# Patient Record
Sex: MALE | Race: WHITE
[De-identification: names, ages, dates, MRNs, and addresses within clinical notes are randomized per-mention and may not be internally consistent; named-entity substitution may affect disease eponyms.]

---

## 2018-12-18 ENCOUNTER — HOSPITAL ENCOUNTER (OUTPATIENT)
Dept: HOSPITAL 47 - RADXRMAIN | Age: 63
Discharge: HOME | End: 2018-12-18
Attending: FAMILY MEDICINE
Payer: COMMERCIAL

## 2018-12-18 DIAGNOSIS — M48.061: Primary | ICD-10-CM

## 2018-12-18 PROCEDURE — 72110 X-RAY EXAM L-2 SPINE 4/>VWS: CPT

## 2018-12-18 NOTE — XR
EXAMINATION TYPE: XR lumbosacral spine min 4V

 

DATE OF EXAM: 12/18/2018

 

CLINICAL HISTORY: Chronic low back pain worse recently.

 

TECHNIQUE: Frontal, lateral, and oblique images of the lumbar spine are obtained.

 

COMPARISON: None.

 

FINDINGS:  There are 5 lumbar type vertebral bodies identified.  The lumbar spine shows satisfactory 
alignment without evidence of acute fracture or dislocation. Vertebral body heights heights are withi
n normal limits. There is mild disc space narrowing upper lumbar levels. There is mild multilevel ant
erior spurring.   The oblique images appear within normal limits. Cholecystectomy clips are seen in t
he overlying soft tissue.

 

IMPRESSION: As above.

## 2020-06-10 ENCOUNTER — HOSPITAL ENCOUNTER (OUTPATIENT)
Dept: HOSPITAL 47 - LABPAT | Age: 65
Discharge: HOME | End: 2020-06-10
Attending: ORTHOPAEDIC SURGERY
Payer: COMMERCIAL

## 2020-06-10 DIAGNOSIS — Z01.812: Primary | ICD-10-CM

## 2020-06-10 PROCEDURE — 87070 CULTURE OTHR SPECIMN AEROBIC: CPT

## 2020-06-15 NOTE — HP
HISTORY AND PHYSICAL



CHIEF COMPLAINT:

Right knee pain.



HISTORY OF PRESENT ILLNESS:

Patient is a 64-year-old retired gentleman who presents with progressive right knee

pain secondary to osteoarthrosis for the past several years.  It has worsened recently.

He notes giving out, in addition to swelling and stiffness.  He notes it limits his

normal function and activities.  He has tried previous medications along with

injections without much relief.



PAST MEDICAL HISTORY:

Significant for arthritis.



PAST SURGICAL HISTORY:

Negative.



CURRENT MEDICATIONS:

None.  He denies drug allergies.



FAMILY HISTORY:

Negative.



SOCIAL HISTORY:

Negative for current tobacco or alcohol use.



16 POINT REVIEW OF SYSTEMS:

Otherwise reviewed and is noncontributory.



PHYSICAL EXAMINATION:

On examination, the patient is approximately 5 foot 9, 170 pounds of mesomorphic

habitus.

HEENT exam is nonfocal.

NECK:  Supple.  He has painless passive motion of the right hip.  Straight leg raise is

negative.  Active motion right knee -10 to 120 degrees of flexion.  He is tender about

the medial joint line.  He has no effusion.  Collaterals are stable, Lachman is

negative, Calvin's is equivocal.  He has genu varum alignment.  His distal

neurovascular exam appears intact in the right lower extremity.



Weightbearing notch, lateral Merchant views of the right knee are obtained in the

office and show severe medial and patellofemoral compartment narrowing.



IMPRESSION:

Right knee severe medial and patellofemoral compartment osteoarthrosis.



RECOMMENDATIONS:

I talked to the patient at length regarding his condition and along the treatment

options.  This point he is quite symptomatic and limited because of pain related to his

osteoarthrosis despite extensive conservative measures.  After thorough discussion, he

opts to proceed with surgery.  We will plan to proceed with right total knee

arthroplasty.  We will institute DVT prophylaxis postoperatively.  Risks and benefits

were discussed at length in layman's terms.





MMODL / IJN: [id number] / Job#: [id number]

## 2020-06-16 ENCOUNTER — HOSPITAL ENCOUNTER (OUTPATIENT)
Dept: HOSPITAL 47 - OR | Age: 65
LOS: 1 days | Discharge: HOME HEALTH | End: 2020-06-17
Attending: ORTHOPAEDIC SURGERY
Payer: COMMERCIAL

## 2020-06-16 VITALS — BODY MASS INDEX: 24.7 KG/M2

## 2020-06-16 DIAGNOSIS — Z87.11: ICD-10-CM

## 2020-06-16 DIAGNOSIS — Z90.49: ICD-10-CM

## 2020-06-16 DIAGNOSIS — K21.9: ICD-10-CM

## 2020-06-16 DIAGNOSIS — D72.828: ICD-10-CM

## 2020-06-16 DIAGNOSIS — Z98.890: ICD-10-CM

## 2020-06-16 DIAGNOSIS — K29.70: ICD-10-CM

## 2020-06-16 DIAGNOSIS — M81.0: ICD-10-CM

## 2020-06-16 DIAGNOSIS — R00.1: ICD-10-CM

## 2020-06-16 DIAGNOSIS — M17.11: Primary | ICD-10-CM

## 2020-06-16 DIAGNOSIS — M54.9: ICD-10-CM

## 2020-06-16 DIAGNOSIS — R12: ICD-10-CM

## 2020-06-16 DIAGNOSIS — Z79.4: ICD-10-CM

## 2020-06-16 PROCEDURE — 73560 X-RAY EXAM OF KNEE 1 OR 2: CPT

## 2020-06-16 PROCEDURE — 97161 PT EVAL LOW COMPLEX 20 MIN: CPT

## 2020-06-16 PROCEDURE — 76942 ECHO GUIDE FOR BIOPSY: CPT

## 2020-06-16 PROCEDURE — 64448 NJX AA&/STRD FEM NRV NFS IMG: CPT

## 2020-06-16 PROCEDURE — 85025 COMPLETE CBC W/AUTO DIFF WBC: CPT

## 2020-06-16 PROCEDURE — 27447 TOTAL KNEE ARTHROPLASTY: CPT

## 2020-06-16 PROCEDURE — 88300 SURGICAL PATH GROSS: CPT

## 2020-06-16 RX ADMIN — POTASSIUM CHLORIDE SCH MLS: 14.9 INJECTION, SOLUTION INTRAVENOUS at 13:34

## 2020-06-16 NOTE — P.ANPRN
Procedure Note - Anesthesia





- Nerve Block Performed


  ** Right Adductor Canal Infusion


Date of Procedure: 06/16/20


Procedure Start Time: 13:52


Procedure Stop Time: 14:05


Location of Patient: PreOp


Indication: Acute Post-Operative Pain, Requested by Surgeon


Sedation Type: Sedate with meaningful contact maintained


Preparation: Sterile Prep, Sterile Dressing


Position: Supine


Catheter: Indwelling


Needle Types: Pajunk


Needle Gauge: 21


Ultrasound used to visualize needle placement: Yes


Ultrasound used to observe medication spread: Yes


Blood Aspirated: No


Pain Paresthesia on Injection Noted: No


Resistance on Injection: Normal


Image Stored and Saved: Yes


Events: Uneventful and Well Tolerated (25 mls of ropivacaine 0.5 %)

## 2020-06-16 NOTE — P.OP
Date of Procedure: 06/16/20


Preoperative Diagnosis: 


Right knee severe tricompartmental osteoarthrosis


Postoperative Diagnosis: 


Same


Procedure(s) Performed: 


Right total knee arthroplastycementedcruciate retaining


Implants: 


[employer] journey size 7 cemented femoral component, size 5 cemented tibial

component, 10 mm articular surface and 35 mm patellar component.


Anesthesia: regional, local, spinal


Surgeon: Marco Brady


Assistant #1: Rodger Abdi


Estimated Blood Loss (ml): 50


Pathology: other (Bone fragments)


Condition: stable


Disposition: PACU


Indications for Procedure: 


Patient is a 64-year-old gentleman who presents with progressive right knee pain

secondary to osteoporosis despite conservative measures.  A discussion of the 

risks and benefits of operative intervention versus continued conservative 

measures was made with patient.  He opted to proceed with surgery.  Operative 

risks to include infection, neurovascular injury, development of blood clots, 

possible component loosening, possible component failure need for subsequent pr

ocedures was discussed.  Informed consent was obtained.


Operative Findings: 


As below


Description of Procedure: 


The patient was brought to the operating room, and after induction of spinal 

anesthesia the right lower extremity was prepped and draped in a normal fashion.

 The tourniquet was inflated to 270 mmHg.  A longitudinal incision extending 3 

finger breaths above  the superior pole of the patella extending to the medial 

aspect the tibial tubercle was then made.  The skin and subcutaneous tissues 

were divided sharply.  Electrocautery was used for hemostasis.  A medial 

parapatellar arthrotomy was then performed.  The medial soft tissues to include 

the superficial and deep portions of the medial collateral ligament as well as 

the medial hamstring tendons were elevated subperiosteally.  The proximal medial

tibia osteophytes were carefully removed.  The patella was everted.  The knee 

was flexed.  A portion of the retropatellar fat pad was excised sharply.  The 

anterior cruciate ligament was sacrificed.  A starting hole was made in the 

distal femur 1 cm anterior to the posterior cruciate origin.  An intramedullary 

femoral guide was gently inserted planning on 5  valgus distal cut with 9 mm 

distal resection.  The cutting block was pinned in place.  The distal cut was 

then made.  The posterior referencing sizing guide was utilized.  3  of 

external rotation was built into the system and verified off the trans-

epicondylar axis and the posterior condyles.  I felt size 7 was most 

appropriate.  The cutting block was pinned in place.  The anterior, posterior, 

and chamfer cuts were then made.  The bone fragments were removed.    The trial 

size 7 femoral component was then placed and was fully seated.  There was good 

anterior to posterior and medial to lateral fit.  The distal peg holes were then

drilled.  The trial component was then removed.  Attention was then paid towards

preparing the proximal tibia.  An extra medullary guide was utilized in line 

with the tibial shaft and second metatarsal distally.  A 3  posterior slope was

planned.  I planned on 2 mm resection from the medial compartment.  The cutting 

block was pinned in place.  The proximal tibial cut was then made.  The bone was

removed in one fragment.  The remnants of the medial and lateral menisci were 

excised the capsule junction with electrocautery.  The tibia sized most 

appropriately at size 5.  The posterior osteophytes off the distal femur were 

carefully removed with a curved osteotome.  The trial tibial and femoral 

components were placed along with a 10 millimeters articular surface.  I was 

able to obtain full flexion and extension with good stability with varus and 

valgus stress.  After several flexion and extension cycles, the tibial rotation 

was marked with electrocautery in line with the medial one third of the tibial 

tubercle.  Attention was then paid towards preparing the patella.  A patella 

reamer was utilized taking this down to 14 mm of bone stock.  A good flush cut 

was made.  The patella sized most appropriately at 35 millimeters.  The peg 

holes were then drilled.  The trial component was placed.  The knee was taken t

hrough a range of motion.  I had good patellofemoral tracking with no hands 

technique.  The trial components were then removed.  The tibia was prepared in 

the appropriate rotation with appropriate drill and keel punch.  The flexion and

extension gaps were checked and felt to be symmetric.  The posterior soft 

tissues were injected with ropivacaine.  The bony surfaces were prepared with 

pulsatile lavage and dried.  The deep tibial component was then cemented in 

place and was fully seated.  Excess cement was removed.  The femoral component 

was cemented in place and was fully seated.  Again excess cement was removed.  

The trial 10 millimeters surface was then inserted in the knee was put in full 

extension.  The patella component was cemented in place.  After the cement had 

sufficiently hardened, the knee was again taken through a range of motion.  

Again there was good stability in flexion and extension with varus and valgus 

stress.  The trial articular surface was then removed.  The final articular 

surface was placed and was impacted.  Care was taken to avoid any soft tissue 

interposition.  Pulsatile lavage was again utilized.  The tourniquet was 

deflated with  approximately 75 minutes total tourniquet time.  There was 

minimal drainage therefore a deep drain was not placed.  The medial parapatellar

arthrotomy was then closed with #2 Ethibond suture.  The subcutaneous tissues 

were reapproximated interrupted 2-0 Vicryl sutures.  The skin was reapproximated

with 3-0 subarticular strata fix suture.  Skin tape and adhesive was applied.  A

sterile dressing was applied.  The patient was then awoken from sedation and 

transferred to recovery room in good condition.  Blood loss was estimated at D 

milliliters.  No complications  were incurred.  Sponge and needle counts were 

correct at the end the case.  Trell VARGHESE assisted during the major 

components this case to include exposure, bone resection, and implantation.

## 2020-06-16 NOTE — XR
EXAMINATION TYPE: XR knee limited RT

 

DATE OF EXAM: 6/16/2020

 

COMPARISON: None

 

HISTORY: Postop right knee

 

TECHNIQUE: 2 view right knee

 

FINDINGS: Tibial and femoral components of in place. Postsurgical changes are evident. 

 

IMPRESSION:

1.  No acute fractures post right knee replacement.

## 2020-06-17 VITALS
TEMPERATURE: 97.7 F | SYSTOLIC BLOOD PRESSURE: 154 MMHG | HEART RATE: 87 BPM | DIASTOLIC BLOOD PRESSURE: 77 MMHG | RESPIRATION RATE: 16 BRPM

## 2020-06-17 LAB
BASOPHILS # BLD AUTO: 0 K/UL (ref 0–0.2)
BASOPHILS NFR BLD AUTO: 0 %
EOSINOPHIL # BLD AUTO: 0 K/UL (ref 0–0.7)
EOSINOPHIL NFR BLD AUTO: 0 %
ERYTHROCYTE [DISTWIDTH] IN BLOOD BY AUTOMATED COUNT: 5.05 M/UL (ref 4.3–5.9)
ERYTHROCYTE [DISTWIDTH] IN BLOOD: 12.4 % (ref 11.5–15.5)
HCT VFR BLD AUTO: 48.6 % (ref 39–53)
HGB BLD-MCNC: 16.3 GM/DL (ref 13–17.5)
LYMPHOCYTES # SPEC AUTO: 1.3 K/UL (ref 1–4.8)
LYMPHOCYTES NFR SPEC AUTO: 6 %
MCH RBC QN AUTO: 32.3 PG (ref 25–35)
MCHC RBC AUTO-ENTMCNC: 33.6 G/DL (ref 31–37)
MCV RBC AUTO: 96.3 FL (ref 80–100)
MONOCYTES # BLD AUTO: 0.9 K/UL (ref 0–1)
MONOCYTES NFR BLD AUTO: 5 %
NEUTROPHILS # BLD AUTO: 18.2 K/UL (ref 1.3–7.7)
NEUTROPHILS NFR BLD AUTO: 88 %
PLATELET # BLD AUTO: 317 K/UL (ref 150–450)
WBC # BLD AUTO: 20.6 K/UL (ref 3.8–10.6)

## 2020-06-17 RX ADMIN — POTASSIUM CHLORIDE SCH: 14.9 INJECTION, SOLUTION INTRAVENOUS at 08:26

## 2020-06-17 NOTE — P.CONS
History of Present Illness





- Reason for Consult


Leukocytosis





- History of Present Illness


This is a pleasant 64-year-old gentleman admitted for right C6 insulin-dependent

surgery patient denied any fever chills dysuria of patient doesn't have any 

other evidence of infection at leukocytosis which is postoperative reactive 

response.  Patient is clinically doing well pain is well-controlled move his 

bowel.








Review of Systems








REVIEW OF SYSTEMS: 


CONSTITUTIONAL: No fever, no malaise, no fatigue. 


HEENT: No recent visual problems or hearing problems. Denied any sore throat. 


CARDIOVASCULAR: No chest pain, orthopnea, PND, no palpitations, no syncope. 


PULMONARY: No shortness of breath, no cough, no hemoptysis. 


GASTROINTESTINAL: No diarrhea, no nausea, no vomiting, no abdominal pain. 


NEUROLOGICAL: No headaches, no weakness, no numbness. 


HEMATOLOGICAL: Denies any bleeding or petechiae. 


GENITOURINARY: Denies any burning micturition, frequency, or urgency. 


MUSCULOSKELETAL/RHEUMATOLOGICAL: Denies any joint pain, swelling, or any muscle 

pain. 


ENDOCRINE: Denies any polyuria or polydipsia. 





The rest of the 14-point review of systems is negative.











Past Medical History


Past Medical History: GERD/Reflux, Osteoarthritis (OA)


Additional Past Medical History / Comment(s): bruises easily, frequent back pain


History of Any Multi-Drug Resistant Organisms: None Reported


Past Surgical History: Hernia Repair, Tonsillectomy


Additional Past Surgical History / Comment(s): EGD


Past Anesthesia/Blood Transfusion Reactions: No Reported Reaction


Past Psychological History: No Psychological Hx Reported


Smoking Status: Never smoker


Past Alcohol Use History: None Reported


Past Drug Use History: None Reported





- Past Family History


  ** Mother


Family Medical History: No Reported History





Medications and Allergies


                                Home Medications











 Medication  Instructions  Recorded  Confirmed  Type


 


Ascorbic Acid [Vitamin C] 500 mg PO DAILY 06/15/20 06/15/20 History


 


Famotidine [Pepcid] 20 mg PO DAILY PRN 06/15/20 06/15/20 History


 


Vitamin E 400 unit PO DAILY 06/15/20 06/15/20 History


 


Apixaban [Eliquis] 2.5 mg PO BID #60 tab 06/17/20  Rx








                                    Allergies











Allergy/AdvReac Type Severity Reaction Status Date / Time


 


No Known Allergies Allergy   Verified 06/15/20 09:07














Physical Exam


Vitals: 


                                   Vital Signs











  Temp Pulse Resp BP Pulse Ox


 


 06/17/20 07:00  97.7 F  87  16  154/77  96


 


 06/17/20 02:00  98.0 F  85   115/61  95


 


 06/16/20 19:26   86  20  125/70  92 L


 


 06/16/20 19:10   79  20  119/72  91 L


 


 06/16/20 18:55   86  20  130/71  93 L


 


 06/16/20 18:40   87  20  133/79  92 L


 


 06/16/20 18:26   89  20  122/72  96


 


 06/16/20 18:10   66   112/70 


 


 06/16/20 17:55  97.9 F  65  20  121/68  96


 


 06/16/20 17:30   63  16  107/69  97


 


 06/16/20 17:15   71  18  110/71  95


 


 06/16/20 17:00    16  134/65  97


 


 06/16/20 14:18   66  16  134/65  97


 


 06/16/20 13:11  96.9 F L  71  16  164/77  100








                                Intake and Output











 06/16/20 06/17/20 06/17/20





 22:59 06:59 14:59


 


Intake Total 200  


 


Output Total 50 250 


 


Balance 150 -250 


 


Intake:   


 


    


 


Output:   


 


  Urine  250 


 


  Estimated Blood Loss 50  


 


Other:   


 


  Voiding Method   Toilet


 


  # Voids  1 


 


  Weight 76.5 kg  

















PHYSICAL EXAMINATION: 





GENERAL: The patient is alert and oriented x3, not in any acute distress. Well 

developed, well nourished. 


HEENT: Pupils are round and equally reacting to light. EOMI. No scleral icterus.

 No conjunctival pallor. Normocephalic, atraumatic. No pharyngeal erythema. No 

thyromegaly. 


CARDIOVASCULAR: S1 and S2 present. No murmurs, rubs, or gallops. 


PULMONARY: Chest is clear to auscultation, no wheezing or crackles. 


ABDOMEN: Soft, nontender, nondistended, normoactive bowel sounds. No palpable 

organomegaly. 


MUSCULOSKELETAL: No joint swelling or deformity.


EXTREMITIES: No cyanosis, clubbing, or pedal edema. 


NEUROLOGICAL: Gross neurological examination did not reveal any focal deficits. 


SKIN: No rashes. 

















Results


CBC & Chem 7: 


                                 06/17/20 06:27





Labs: 


                  Abnormal Lab Results - Last 24 Hours (Table)











  06/17/20 Range/Units





  06:27 


 


WBC  20.6 H  (3.8-10.6)  k/uL


 


Neutrophils #  18.2 H  (1.3-7.7)  k/uL














Assessment and Plan


Plan: 





-Leukocytosis reactive without any evidence of infection, no further 

intervention patient is medically stable to be discharged


-Right total knee arthroplasty: Pain management and patient is being discharged 

on xarelto as due to prophylaxis.

## 2020-06-17 NOTE — P.DS
Providers


Date of admission: 





06/16/2020


Expected date of discharge: 06/17/20


Attending physician: 


Marco Brady





Consults: 





                                        





06/16/20 16:46


Consult Physician Routine 


   Consulting Provider: Harlan Jones Reason/Comments: Medical Management


   Do you want consulting provider notified?: Yes











Primary care physician: 


Harlan Jones





[facility] Course: 





Date of admission: 06/16/2020


Date of discharge: 06/17/2020





Admission diagnosis: Status post right total knee arthroplasty


Discharge diagnosis: Same





Attending physician: Dr. Brady





Surgical procedures: Right total knee arthroplasty





Brief history: Patient is a 64-year-old male with a history of progressive 

primary right knee osteoarthritis.  At this point patient has failed 

conservative treatment measures and has opted to proceed with a elective right 

total knee arthroplasty.





Hospital course: Details of patient's surgery can be found in operative report. 

Patient tolerated the procedure well and was subsequently transported to 

orthopedic floor.  Patient's orthopeidc and medical care was provided daily. 

Patient had daily laboratory tests performed for evaluation of overall blood 

counts.  Patient had daily physical therapy to include strengthening range of 

motion as well as education with walker ambulation.  Patient had daily CPM usage

as part of their physical therapy program.  Patient was treated with [Xarelto] 

for their postoperative DVT prophylaxis during their inpatient stay.  Patient 

was noted to have a relatively uneventful postoperative course.  Patient 

reported satisfactory pain control with oral pain medications by postoperative 

day 0.  Patient showed satisfactory progress with physical therapy.  Patient 

moved steadily through the program and had no difficulty meeting the goals by 

postoperative day 1.  Given patient's otherwise satisfactory course and having 

met physical therapy goals, plan is to discharge patient home on postoperative 

day 1.





Discharge condition/disposition: Patient will be discharged home in stable 

condition.





Discharge medications: Instructions are given on resumption of patient's normal 

daily medications per primary care recommendation, in addition patient will be 

prescribed Eliquis 2.5mg.





Discharge instructions:


1.  Wound care and infection precautions, keep incision dry and covered while 

showering, no lotions, creams, moisturizers. No soaking, tubs, pools, hottubs. 

Do not scrub over the incision.


2.  Weight-bear as tolerated with walker / cane until follow-up.


3.  Ice and elevate when necessary.  Do not exceed 20 minutes per hour with ice 

pack.


4.  Utilize compression sleeve until seen at first follow up appointment.


5.  Visiting nursing care.


6.  Home physical therapy including home CPM.


7.  Pain meds and anticoagulants per prescription.


8.  Pain medication has potential to cause constipation. Increase oral fluid and

 fiber intake. Contact primary care provider if you have not had a bowel 

movement within 48 hours after discharge


9.  No anti-inflammatory medication until discussed at first post operative 

visit, this including Motrin, Aleve, Mobic, Diclofenac


10.  Follow up in office at 2 weeks postop with Trell Abdi PA-C


11. Follow up with your primary care doctor 7-10 days after discharge.


12. Contact [facility] with any questions, [phone].











Procedures: 





Right total knee arthroplasty


Patient Condition at Discharge: Good





Plan - Discharge Summary


Discharge Rx Participant: No


New Discharge Prescriptions: 


New


   Apixaban [Eliquis] 2.5 mg PO BID #60 tab





No Action


   Ascorbic Acid [Vitamin C] 500 mg PO DAILY


   Vitamin E 400 unit PO DAILY


   Famotidine [Pepcid] 20 mg PO DAILY PRN


     PRN Reason: Heartburn


Discharge Medication List





Ascorbic Acid [Vitamin C] 500 mg PO DAILY 06/15/20 [History]


Famotidine [Pepcid] 20 mg PO DAILY PRN 06/15/20 [History]


Vitamin E 400 unit PO DAILY 06/15/20 [History]


Apixaban [Eliquis] 2.5 mg PO BID #60 tab 06/17/20 [Rx]








Follow up Appointment(s)/Referral(s): 


Harlan Jones DO [Primary Care Provider] - 06/26/20 7:30 am


[facility], [NON-STAFF] - As Needed


Rodger Abdi PAC [PHYSICIAN ASSISTANT] - 07/01/20 3:30 pm


[facility]aren Sung [NON-STAFF] - As Needed (Continuous Passive Motion knee machine)


Patient Instructions/Handouts:  *Surgery [facility] - On-Q Pain Pump Discharge 

Instructions, Knee Replacement (DC)


Activity/Diet/Wound Care/Special Instructions: 


Orthopedic Discharge Instructions:


1.  Wound care and infection precautions, keep incision dry and covered while 

showering, no lotions, creams, moisturizers. No soaking, pools, hot tubs. Do not

scrub over incision.


2.  Weight-bear as tolerated with walker / cane until follow-up.


3.  Ice and elevate when necessary.  Do not exceed 20 minutes per hour with ice 

pack.


4.  Utilize compression sleeve until seen at first follow up appointment.


5.  Pain meds and anticoagulants per prescription.


6.  Pain medication has potential to cause constipation. Increase oral fluid and

fiber intake. Contact primary care provider if you have not had a bowel movement

within 48 hours after discharge.


7.  No anti-inflammatory medication until discussed at first post operative 

visit, this including Motrin, Aleve, Mobic, Diclofenac.


8. Follow up in office at 2 weeks postop with Trell Abdi PA-C


9. Follow up with your primary care doctor 7-10 days after discharge.


10. Contact [facility] with any questions, [phone].





Discharge Disposition: HOME WITH HOME HEALTH SERVICES

## 2020-06-17 NOTE — P.PN
Progress Note - Text


Progress Note Date: 06/17/20


The patient is doing well status post total knee replacement.  Pain is well con

trolled by a combination of local anesthetic infusion through the adductor canal

catheter and oral analgesics.  There are no signs of infection around the 

catheter skin entry site.


The local anesthetic infusion will be continued as per protocol.

## 2020-06-17 NOTE — P.PN
Subjective


Progress Note Date: 06/17/20


Principal diagnosis: 





Status post right total knee arthroplasty





Patient evaluated today at bedside, he is resting comfortably.  His done very 

well with physical therapy.  As noted to pain.  He denies chest pain or 

shortness of breath.





Objective





- Vital Signs


Vital signs: 


                                   Vital Signs











Temp  97.7 F   06/17/20 07:00


 


Pulse  87   06/17/20 07:00


 


Resp  16   06/17/20 07:00


 


BP  154/77   06/17/20 07:00


 


Pulse Ox  96   06/17/20 07:00








                                 Intake & Output











 06/16/20 06/17/20 06/17/20





 18:59 06:59 18:59


 


Intake Total 1250  


 


Output Total 50 250 


 


Balance 1200 -250 


 


Weight 76.5 kg  


 


Intake:   


 


  IV 1250  


 


Output:   


 


  Urine  250 


 


  Estimated Blood Loss 50  


 


Other:   


 


  Voiding Method   Toilet


 


  # Voids  1 














- Exam





Right lower extremity:


Incision is clean, dry, and intact.  The exofin fusion tape is in good 

condition.  There is minimal soft tissue swelling and ecchymosis surrounding the

medial and lateral aspects of the incision.  Calf is soft, no tenderness with 

palpation.  Plantar flexion, dorsiflexion, EHL, FHL are intact.  Sensory exam to

light touch throughout the extremity is intact, dorsal pedis pulses 2+.








- Labs


CBC & Chem 7: 


                                 06/17/20 06:27





Labs: 


                  Abnormal Lab Results - Last 24 Hours (Table)











  06/17/20 Range/Units





  06:27 


 


WBC  20.6 H  (3.8-10.6)  k/uL


 


Neutrophils #  18.2 H  (1.3-7.7)  k/uL














Assessment and Plan


Assessment: 





Status post right total knee arthroplasty


Plan: 





Pain control, doing well at this time, plan for discharge on just Tylenol





GI and DVT prophylaxis, Eliquis 2.5mg bid





Wound care instructions discussed





Home physical therapy and nursing after discharge





Plan for discharge home today


Time with Patient: Less than 30

## 2021-06-21 ENCOUNTER — HOSPITAL ENCOUNTER (OUTPATIENT)
Dept: HOSPITAL 47 - LABWHC1 | Age: 66
Discharge: HOME | End: 2021-06-21
Attending: ORTHOPAEDIC SURGERY
Payer: MEDICARE

## 2021-06-21 DIAGNOSIS — M25.50: Primary | ICD-10-CM

## 2021-06-21 LAB
BASOPHILS # BLD AUTO: 0.04 X 10*3/UL (ref 0–0.1)
BASOPHILS NFR BLD AUTO: 0.5 %
EOSINOPHIL # BLD AUTO: 0.13 X 10*3/UL (ref 0.04–0.35)
EOSINOPHIL NFR BLD AUTO: 1.6 %
ERYTHROCYTE [DISTWIDTH] IN BLOOD BY AUTOMATED COUNT: 5.23 X 10*6/UL (ref 4.4–5.6)
ERYTHROCYTE [DISTWIDTH] IN BLOOD: 12.5 % (ref 11.5–14.5)
HCT VFR BLD AUTO: 50.1 % (ref 39.6–50)
HGB BLD-MCNC: 16.4 G/DL (ref 13–17)
LYMPHOCYTES # SPEC AUTO: 2.15 X 10*3/UL (ref 0.9–5)
LYMPHOCYTES NFR SPEC AUTO: 25.9 %
MCH RBC QN AUTO: 31.4 PG (ref 27–32)
MCHC RBC AUTO-ENTMCNC: 32.7 G/DL (ref 32–37)
MCV RBC AUTO: 95.8 FL (ref 80–97)
MONOCYTES # BLD AUTO: 0.67 X 10*3/UL (ref 0.2–1)
MONOCYTES NFR BLD AUTO: 8.1 %
NEUTROPHILS # BLD AUTO: 5.28 X 10*3/UL (ref 1.8–7.7)
NEUTROPHILS NFR BLD AUTO: 63.7 %
PLATELET # BLD AUTO: 281 X 10*3/UL (ref 140–440)
WBC # BLD AUTO: 8.29 X 10*3/UL (ref 4.5–10)

## 2021-06-21 PROCEDURE — 36415 COLL VENOUS BLD VENIPUNCTURE: CPT

## 2021-06-21 PROCEDURE — 86140 C-REACTIVE PROTEIN: CPT

## 2021-06-21 PROCEDURE — 85025 COMPLETE CBC W/AUTO DIFF WBC: CPT

## 2021-06-21 PROCEDURE — 85652 RBC SED RATE AUTOMATED: CPT

## 2023-05-29 ENCOUNTER — HOSPITAL ENCOUNTER (EMERGENCY)
Dept: HOSPITAL 47 - EC | Age: 68
Discharge: HOME | End: 2023-05-29
Payer: MEDICARE

## 2023-05-29 VITALS
DIASTOLIC BLOOD PRESSURE: 72 MMHG | RESPIRATION RATE: 16 BRPM | HEART RATE: 73 BPM | SYSTOLIC BLOOD PRESSURE: 123 MMHG | TEMPERATURE: 98.4 F

## 2023-05-29 DIAGNOSIS — Z90.49: ICD-10-CM

## 2023-05-29 DIAGNOSIS — N20.0: Primary | ICD-10-CM

## 2023-05-29 LAB
ALBUMIN SERPL-MCNC: 3.9 G/DL (ref 3.5–5)
ALP SERPL-CCNC: 70 U/L (ref 38–126)
ALT SERPL-CCNC: 26 U/L (ref 4–49)
ANION GAP SERPL CALC-SCNC: 10 MMOL/L
AST SERPL-CCNC: 30 U/L (ref 17–59)
BASOPHILS # BLD AUTO: 0 K/UL (ref 0–0.2)
BASOPHILS NFR BLD AUTO: 0 %
BUN SERPL-SCNC: 20 MG/DL (ref 9–20)
CALCIUM SPEC-MCNC: 8.8 MG/DL (ref 8.4–10.2)
CHLORIDE SERPL-SCNC: 102 MMOL/L (ref 98–107)
CO2 SERPL-SCNC: 21 MMOL/L (ref 22–30)
EOSINOPHIL # BLD AUTO: 0 K/UL (ref 0–0.7)
EOSINOPHIL NFR BLD AUTO: 0 %
ERYTHROCYTE [DISTWIDTH] IN BLOOD BY AUTOMATED COUNT: 5.22 M/UL (ref 4.3–5.9)
ERYTHROCYTE [DISTWIDTH] IN BLOOD: 12.4 % (ref 11.5–15.5)
GLUCOSE SERPL-MCNC: 108 MG/DL (ref 74–99)
HCT VFR BLD AUTO: 48.6 % (ref 39–53)
HGB BLD-MCNC: 16.2 GM/DL (ref 13–17.5)
KETONES UR QL STRIP.AUTO: (no result)
LYMPHOCYTES # SPEC AUTO: 0.6 K/UL (ref 1–4.8)
LYMPHOCYTES NFR SPEC AUTO: 5 %
MCH RBC QN AUTO: 31.1 PG (ref 25–35)
MCHC RBC AUTO-ENTMCNC: 33.4 G/DL (ref 31–37)
MCV RBC AUTO: 93 FL (ref 80–100)
MONOCYTES # BLD AUTO: 0.9 K/UL (ref 0–1)
MONOCYTES NFR BLD AUTO: 6 %
NEUTROPHILS # BLD AUTO: 11.6 K/UL (ref 1.3–7.7)
NEUTROPHILS NFR BLD AUTO: 87 %
PH UR: 5 [PH] (ref 5–8)
PLATELET # BLD AUTO: 228 K/UL (ref 150–450)
POTASSIUM SERPL-SCNC: 4.7 MMOL/L (ref 3.5–5.1)
PROT SERPL-MCNC: 6.3 G/DL (ref 6.3–8.2)
RBC UR QL: 2 /HPF (ref 0–5)
SODIUM SERPL-SCNC: 133 MMOL/L (ref 137–145)
SP GR UR: 1.02 (ref 1–1.03)
UROBILINOGEN UR QL STRIP: <2 MG/DL (ref ?–2)
WBC # BLD AUTO: 13.3 K/UL (ref 3.8–10.6)
WBC #/AREA URNS HPF: <1 /HPF (ref 0–5)

## 2023-05-29 PROCEDURE — 87040 BLOOD CULTURE FOR BACTERIA: CPT

## 2023-05-29 PROCEDURE — 93005 ELECTROCARDIOGRAM TRACING: CPT

## 2023-05-29 PROCEDURE — 80053 COMPREHEN METABOLIC PANEL: CPT

## 2023-05-29 PROCEDURE — 36415 COLL VENOUS BLD VENIPUNCTURE: CPT

## 2023-05-29 PROCEDURE — 83605 ASSAY OF LACTIC ACID: CPT

## 2023-05-29 PROCEDURE — 85025 COMPLETE CBC W/AUTO DIFF WBC: CPT

## 2023-05-29 PROCEDURE — 99284 EMERGENCY DEPT VISIT MOD MDM: CPT

## 2023-05-29 PROCEDURE — 81001 URINALYSIS AUTO W/SCOPE: CPT

## 2023-05-29 RX ADMIN — NICARDIPINE HYDROCHLORIDE SCH MLS/HR: 2.5 INJECTION INTRAVENOUS at 09:55

## 2023-05-29 RX ADMIN — NICARDIPINE HYDROCHLORIDE SCH MLS/HR: 2.5 INJECTION INTRAVENOUS at 10:30

## 2023-05-29 NOTE — ED
General Adult HPI





- General


Chief complaint: Abdominal Pain


Stated complaint: kidney stone


Time Seen by Provider: 05/29/23 09:25


Source: patient, RN notes reviewed, old records reviewed


Mode of arrival: ambulatory


Limitations: no limitations





- History of Present Illness


Initial comments: 





This is a 67-year-old female who presents emergency Department with a known 

kidney stone.  Patient states she has a 4 mm Keystone in the proximal ureter on 

the left he was diagnosed at [facility].  Patient states she spiked a 

fever today and the pain did not get any better so they told him come to the 

emergency department.  Patient's fever and the emergency department was 101.4 

orally.  Patient denied any significant abdominal pain he states the pain is 

mostly in the flank and wraps around to the back.





- Related Data


                                Home Medications











 Medication  Instructions  Recorded  Confirmed


 


Ascorbic Acid [Vitamin C] 500 mg PO DAILY 06/15/20 06/15/20


 


Famotidine [Pepcid] 20 mg PO DAILY PRN 06/15/20 06/15/20


 


Vitamin E 400 unit PO DAILY 06/15/20 06/15/20








                                  Previous Rx's











 Medication  Instructions  Recorded


 


Apixaban [Eliquis] 2.5 mg PO BID #60 tab 06/17/20











                                    Allergies











Allergy/AdvReac Type Severity Reaction Status Date / Time


 


No Known Allergies Allergy   Verified 05/29/23 09:28














Review of Systems


ROS Statement: 


Those systems with pertinent positive or pertinent negative responses have been 

documented in the HPI.





ROS Other: All systems not noted in ROS Statement are negative.





Past Medical History


Past Medical History: GERD/Reflux


History of Any Multi-Drug Resistant Organisms: None Reported


Past Surgical History: Cholecystectomy, Hernia Repair, Joint Replacement


Past Psychological History: No Psychological Hx Reported


Smoking Status: Never smoker


Past Alcohol Use History: None Reported


Past Drug Use History: None Reported





General Exam





- General Exam Comments


Initial Comments: 





GENERAL:


Patient is well-developed and well-nourished.  Patient is nontoxic and well-

hydrated and is in moderate distress.





ENT:


Neck is soft and supple.  No significant lymphadenopathy is noted.  Oropharynx 

is clear.  Moist mucous membranes.  Neck has full range of motion without 

eliciting any pain. 





EYES:


The sclera were anicteric and conjunctiva were pink and moist.  Extraocular 

movements were intact and pupils were equal round and reactive to light.  

Eyelids were unremarkable.





PULMONARY:


Unlabored respirations.  Good breath sounds bilaterally.  No audible rales 

rhonchi or wheezing was noted.





CARDIOVASCULAR:


There is a regular rate and rhythm without any murmurs gallops or rubs. 





ABDOMEN:


Soft and nontender with normal bowel sounds.  





SKIN:


Skin is clear with no lesions or rashes and otherwise unremarkable.





NEUROLOGIC:


Patient is alert and oriented x3.  Cranial nerves II through XII are grossly 

intact.  Motor and sensory are also intact.  Normal speech, volume and content. 

 Symmetrical smile.  





MUSCULOSKELETAL:


Normal extremities with adequate strength and full range of motion.  





LYMPHATICS:


No significant lymphadenopathy is noted





PSYCHIATRIC:


Normal psychiatric evaluation.  





Limitations: no limitations





Course


                                   Vital Signs











  05/29/23 05/29/23 05/29/23





  09:25 10:00 10:30


 


Temperature 98.7 F 101.4 F H 


 


Pulse Rate 86 81 80


 


Respiratory 20 14 14





Rate   


 


Blood Pressure 155/81 148/84 134/78


 


O2 Sat by Pulse 99 96 96





Oximetry   














  05/29/23





  11:00


 


Temperature 98.5 F


 


Pulse Rate 81


 


Respiratory 14





Rate 


 


Blood Pressure 127/71


 


O2 Sat by Pulse 95





Oximetry 














Medical Decision Making





- Medical Decision Making





I interpreted the EKG.  EKG shows a sinus rhythm at 75 bpm IL interval 258 QRSs 

112 QT interval 365 QTC is 394.  EKG shows no acute ST segment elevation or 

depression





Was pt. sent in by a medical professional or institution (ABIMAEL Lovell, NP, urgent 

care, hospital, or nursing home...) When possible be specific


@  -Patient's physician sent him to the ER


Did you speak to anyone other than the patient for history (EMS, parent, family,

 police, friend...)? What history was obtained from this source 


@  -No


Did you review nursing and triage notes (agree or disagree)?  Why? 


@  -I reviewed and agree with nursing and triage notes


Were old charts reviewed (outside hosp., previous admission, EMS record, old 

EKG, old radiological studies, urgent care reports/EKG's, nursing home records)?

 Report findings 


@  -Patient brought in a disc with his CT of his abdomen and pelvis, I reviewed 

that and the results.


Differential Diagnosis (chest pain, altered mental status, abdominal pain women,

 abdominal pain men, vaginal bleeding, weakness, fever, dyspnea, syncope, 

headache, dizziness, GI bleed, back pain, seizure, CVA, palpatations, mental 

health, musculoskeletal)? 


@  -Differential Fever:


Pneumonia, viral URI, endocarditis, myocarditis, pericarditis, otitis, 

sinusitis, peritonsillar Abscess, retropharyngeal Abscess, epiglottitis, 

peritonitis, appendicitis, Marcy cystitis, diverticulitis, hepatitis, colitis, 

UTI, PID, TOA, pyelonephritis, prostatitis, epididymitis, meningitis, 

encephalitis, pulmonary embolism, CVA, thyroid storm, pancreatitis, adrenal 

crisis, cavernous sinus thrombosis, this is not meant to be an all-inclusive 

list. 


EKG interpreted by me (3pts min.).


@  -As above


X-rays interpreted by me (1pt min.).


@  -None done


CT interpreted by me (1pt min.).


@  -None done


U/S interpreted by me (1pt. min.).


@  -None done


What testing was considered but not performed or refused? (CT, X-rays, U/S, 

labs)? Why?


@  -None


What meds were considered but not given or refused? Why?


@  -None


Did you discuss the management of the patient with other professionals 

(professionals i.e. , PA, NP, lab, RT, psych nurse, , , 

teacher, , )? Give summary


@  -No


Was smoking cessation discussed for >3mins.?


@  -No


Was critical care preformed (if so, how long)?


@  -No


Were there social determinants of health that impacted care today? How? 

(Homelessness, low income, unemployed, alcoholism, drug addiction, 

transportation, low edu. Level, literacy, decrease access to med. care, custodial, 

rehab)?


@  -No


Was there de-escalation of care discussed even if they declined (Discuss DNR or 

withdrawal of care, Hospice)? DNR status


@  -No


What co-morbidities impacted this encounter? (DM, HTN, Smoking, COPD, CAD, 

Cancer, CVA, ARF, Chemo, Hep., AIDS, mental health diagnosis, sleep apnea, 

morbid obesity)?


@  -None


Was patient admitted / discharged? Hospital course, mention meds given and 

route, prescriptions, significant lab abnormalities, going to OR and other 

pertinent info.


@  -Received some pain medication was feeling considerably better.  Patient's 

urine came back without any infection.  Patient was comfortable wanted be 

discharged home patient go home on Tylenol with Codeine and Zofran.


Undiagnosed new problem with uncertain prognosis?


@  -No


Drug Therapy requiring intensive monitoring for toxicity (Heparin, Nitro, 

Insulin, Cardizem)?


@  -No


Were any procedures done?


@  -No


Diagnosis/symptom?


@  -Kidney stone


Acute, or Chronic, or Acute on Chronic?


@  -Acute


Uncomplicated (without systemic symptoms) or Complicated (systemic symptoms)?


@  -Complicated


Side effects of treatment?


@  -No


Exacerbation, Progression, or Severe Exacerbation?


@  -No


Poses a threat to life or bodily function? How? (Chest pain, USA, MI, pneumonia,

 PE, COPD, DKA, ARF, appy, cholecystitis, CVA, Diverticulitis, Homicidal, 

Suicidal, threat to staff... and all critical care pts)


@  -No


Diagnosis/symptom?


@  -Vomiting


Acute, or Chronic, or Acute on Chronic?


@  -Acute


Uncomplicated (without systemic symptoms) or Complicated (systemic symptoms)?


@  -Uncomplicated


Side effects of treatment?


@  -none


Exacerbation, Progression, or Severe Exacerbation]


@  -no


Poses a threat to life or bodily function?


@  -no





- Lab Data


Result diagrams: 


                                 05/29/23 09:40





                                 05/29/23 09:40


                                   Lab Results











  05/29/23 05/29/23 05/29/23 Range/Units





  09:40 09:40 09:40 


 


WBC  13.3 H    (3.8-10.6)  k/uL


 


RBC  5.22    (4.30-5.90)  m/uL


 


Hgb  16.2    (13.0-17.5)  gm/dL


 


Hct  48.6    (39.0-53.0)  %


 


MCV  93.0    (80.0-100.0)  fL


 


MCH  31.1    (25.0-35.0)  pg


 


MCHC  33.4    (31.0-37.0)  g/dL


 


RDW  12.4    (11.5-15.5)  %


 


Plt Count  228    (150-450)  k/uL


 


MPV  6.7    


 


Neutrophils %  87    %


 


Lymphocytes %  5    %


 


Monocytes %  6    %


 


Eosinophils %  0    %


 


Basophils %  0    %


 


Neutrophils #  11.6 H    (1.3-7.7)  k/uL


 


Lymphocytes #  0.6 L    (1.0-4.8)  k/uL


 


Monocytes #  0.9    (0-1.0)  k/uL


 


Eosinophils #  0.0    (0-0.7)  k/uL


 


Basophils #  0.0    (0-0.2)  k/uL


 


Sodium   133 L   (137-145)  mmol/L


 


Potassium   4.7   (3.5-5.1)  mmol/L


 


Chloride   102   ()  mmol/L


 


Carbon Dioxide   21 L   (22-30)  mmol/L


 


Anion Gap   10   mmol/L


 


BUN   20   (9-20)  mg/dL


 


Creatinine   1.40 H   (0.66-1.25)  mg/dL


 


Est GFR (CKD-EPI)AfAm   61   (>60 ml/min/1.73 sqM)  


 


Est GFR (CKD-EPI)NonAf   53   (>60 ml/min/1.73 sqM)  


 


Glucose   108 H   (74-99)  mg/dL


 


Plasma Lactic Acid Kash    0.9  (0.7-2.0)  mmol/L


 


Calcium   8.8   (8.4-10.2)  mg/dL


 


Total Bilirubin   1.8 H   (0.2-1.3)  mg/dL


 


AST   30   (17-59)  U/L


 


ALT   26   (4-49)  U/L


 


Alkaline Phosphatase   70   ()  U/L


 


Total Protein   6.3   (6.3-8.2)  g/dL


 


Albumin   3.9   (3.5-5.0)  g/dL


 


Urine Color     


 


Urine Appearance     (Clear)  


 


Urine pH     (5.0-8.0)  


 


Ur Specific Gravity     (1.001-1.035)  


 


Urine Protein     (Negative)  


 


Urine Glucose (UA)     (Negative)  


 


Urine Ketones     (Negative)  


 


Urine Blood     (Negative)  


 


Urine Nitrite     (Negative)  


 


Urine Bilirubin     (Negative)  


 


Urine Urobilinogen     (<2.0)  mg/dL


 


Ur Leukocyte Esterase     (Negative)  


 


Urine RBC     (0-5)  /hpf


 


Urine WBC     (0-5)  /hpf


 


Urine Mucus     (None)  /hpf














  05/29/23 Range/Units





  09:40 


 


WBC   (3.8-10.6)  k/uL


 


RBC   (4.30-5.90)  m/uL


 


Hgb   (13.0-17.5)  gm/dL


 


Hct   (39.0-53.0)  %


 


MCV   (80.0-100.0)  fL


 


MCH   (25.0-35.0)  pg


 


MCHC   (31.0-37.0)  g/dL


 


RDW   (11.5-15.5)  %


 


Plt Count   (150-450)  k/uL


 


MPV   


 


Neutrophils %   %


 


Lymphocytes %   %


 


Monocytes %   %


 


Eosinophils %   %


 


Basophils %   %


 


Neutrophils #   (1.3-7.7)  k/uL


 


Lymphocytes #   (1.0-4.8)  k/uL


 


Monocytes #   (0-1.0)  k/uL


 


Eosinophils #   (0-0.7)  k/uL


 


Basophils #   (0-0.2)  k/uL


 


Sodium   (137-145)  mmol/L


 


Potassium   (3.5-5.1)  mmol/L


 


Chloride   ()  mmol/L


 


Carbon Dioxide   (22-30)  mmol/L


 


Anion Gap   mmol/L


 


BUN   (9-20)  mg/dL


 


Creatinine   (0.66-1.25)  mg/dL


 


Est GFR (CKD-EPI)AfAm   (>60 ml/min/1.73 sqM)  


 


Est GFR (CKD-EPI)NonAf   (>60 ml/min/1.73 sqM)  


 


Glucose   (74-99)  mg/dL


 


Plasma Lactic Acid Kash   (0.7-2.0)  mmol/L


 


Calcium   (8.4-10.2)  mg/dL


 


Total Bilirubin   (0.2-1.3)  mg/dL


 


AST   (17-59)  U/L


 


ALT   (4-49)  U/L


 


Alkaline Phosphatase   ()  U/L


 


Total Protein   (6.3-8.2)  g/dL


 


Albumin   (3.5-5.0)  g/dL


 


Urine Color  Yellow  


 


Urine Appearance  Clear  (Clear)  


 


Urine pH  5.0  (5.0-8.0)  


 


Ur Specific Gravity  1.021  (1.001-1.035)  


 


Urine Protein  Negative  (Negative)  


 


Urine Glucose (UA)  Negative  (Negative)  


 


Urine Ketones  3+ H  (Negative)  


 


Urine Blood  Trace H  (Negative)  


 


Urine Nitrite  Negative  (Negative)  


 


Urine Bilirubin  Negative  (Negative)  


 


Urine Urobilinogen  <2.0  (<2.0)  mg/dL


 


Ur Leukocyte Esterase  Negative  (Negative)  


 


Urine RBC  2  (0-5)  /hpf


 


Urine WBC  <1  (0-5)  /hpf


 


Urine Mucus  Rare H  (None)  /hpf














Disposition


Clinical Impression: 


 Kidney stone, Vomiting





Disposition: HOME SELF-CARE


Condition: Good


Instructions (If sedation given, give patient instructions):  Kidney Stones (ED)


Additional Instructions: 


Patient is to follow-up with urology.  Patient is to return for any new or 

worsening symptoms.


Is patient prescribed a controlled substance at d/c from ED?: No


Referrals: 


Brenna Schwartz MD [Primary Care Provider] - 1-2 days


Time of Disposition: 11:45

## 2025-04-08 ENCOUNTER — HOSPITAL ENCOUNTER (OUTPATIENT)
Dept: HOSPITAL 47 - ORWHC2ENDO | Age: 70
Discharge: HOME | End: 2025-04-08
Attending: INTERNAL MEDICINE
Payer: MEDICARE

## 2025-04-08 VITALS — DIASTOLIC BLOOD PRESSURE: 68 MMHG | SYSTOLIC BLOOD PRESSURE: 111 MMHG | RESPIRATION RATE: 16 BRPM | HEART RATE: 91 BPM

## 2025-04-08 VITALS — TEMPERATURE: 97.4 F

## 2025-04-08 DIAGNOSIS — Z12.11: Primary | ICD-10-CM

## 2025-04-08 DIAGNOSIS — K57.30: ICD-10-CM

## 2025-04-08 RX ADMIN — POTASSIUM CHLORIDE SCH MLS/HR: 14.9 INJECTION, SOLUTION INTRAVENOUS at 09:24

## 2025-04-08 RX ADMIN — POTASSIUM CHLORIDE ONE MLS: 14.9 INJECTION, SOLUTION INTRAVENOUS at 09:24
